# Patient Record
Sex: MALE | Race: WHITE | Employment: STUDENT | URBAN - METROPOLITAN AREA
[De-identification: names, ages, dates, MRNs, and addresses within clinical notes are randomized per-mention and may not be internally consistent; named-entity substitution may affect disease eponyms.]

---
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
## 2022-01-01 ENCOUNTER — HOSPITAL ENCOUNTER (EMERGENCY)
Facility: CLINIC | Age: 22
Discharge: HOME | End: 2022-01-01
Attending: PHYSICIAN ASSISTANT | Admitting: PHYSICIAN ASSISTANT
Payer: COMMERCIAL

## 2022-01-01 ENCOUNTER — APPOINTMENT (OUTPATIENT)
Dept: GENERAL RADIOLOGY | Facility: CLINIC | Age: 22
End: 2022-01-01
Attending: PHYSICIAN ASSISTANT
Payer: COMMERCIAL

## 2022-01-01 VITALS
HEIGHT: 69 IN | BODY MASS INDEX: 28.88 KG/M2 | WEIGHT: 195 LBS | HEART RATE: 82 BPM | RESPIRATION RATE: 16 BRPM | TEMPERATURE: 98.5 F | SYSTOLIC BLOOD PRESSURE: 117 MMHG | OXYGEN SATURATION: 97 % | DIASTOLIC BLOOD PRESSURE: 66 MMHG

## 2022-01-01 DIAGNOSIS — S62.302A CLOSED DISPLACED FRACTURE OF THIRD METACARPAL BONE OF RIGHT HAND, UNSPECIFIED PORTION OF METACARPAL, INITIAL ENCOUNTER: ICD-10-CM

## 2022-01-01 PROCEDURE — 26600 TREAT METACARPAL FRACTURE: CPT | Mod: F7

## 2022-01-01 PROCEDURE — 99284 EMERGENCY DEPT VISIT MOD MDM: CPT | Mod: 25

## 2022-01-01 PROCEDURE — 73130 X-RAY EXAM OF HAND: CPT | Mod: RT

## 2022-01-01 PROCEDURE — 250N000013 HC RX MED GY IP 250 OP 250 PS 637: Performed by: PHYSICIAN ASSISTANT

## 2022-01-01 RX ORDER — HYDROCODONE BITARTRATE AND ACETAMINOPHEN 5; 325 MG/1; MG/1
1 TABLET ORAL EVERY 6 HOURS PRN
Qty: 8 TABLET | Refills: 0 | Status: SHIPPED | OUTPATIENT
Start: 2022-01-01 | End: 2022-01-01

## 2022-01-01 RX ORDER — IBUPROFEN 200 MG
600 TABLET ORAL ONCE
Status: COMPLETED | OUTPATIENT
Start: 2022-01-01 | End: 2022-01-01

## 2022-01-01 RX ORDER — HYDROCODONE BITARTRATE AND ACETAMINOPHEN 5; 325 MG/1; MG/1
1 TABLET ORAL ONCE
Status: COMPLETED | OUTPATIENT
Start: 2022-01-01 | End: 2022-01-01

## 2022-01-01 RX ORDER — HYDROCODONE BITARTRATE AND ACETAMINOPHEN 5; 325 MG/1; MG/1
1 TABLET ORAL EVERY 6 HOURS PRN
Qty: 8 TABLET | Refills: 0 | Status: SHIPPED | OUTPATIENT
Start: 2022-01-01

## 2022-01-01 RX ADMIN — HYDROCODONE BITARTRATE AND ACETAMINOPHEN 1 TABLET: 5; 325 TABLET ORAL at 19:48

## 2022-01-01 RX ADMIN — IBUPROFEN 600 MG: 200 TABLET, FILM COATED ORAL at 19:48

## 2022-01-01 ASSESSMENT — MIFFLIN-ST. JEOR: SCORE: 1874.89

## 2022-01-02 NOTE — ED PROVIDER NOTES
ED ATTENDING PHYSICIAN NOTE:   I evaluated this patient in conjunction with Christiano Patten PA-C  I have participated in the care of the patient and personally performed key elements of the history, exam, and medical decision making.      HPI:   Issac Rome is a 21 year old male right hand dominant presenting with a right hand injury after punching the ground last night. He does have a history of boxer's fracture on this hand but complains of pain in the middle of his hand. Normal sensation. Unable to move his fingers due to pain but he does have range of motion.      EXAM:   Physical Exam   Constitutional:  Patient is oriented to person, place, and time. They appear well-developed and well-nourished. Mild distress secondary to hand pain.    HENT:   Eyes:    Conjunctivae normal and EOM are normal. Pupils are equal, round, and reactive to light.   Neck:    Normal range of motion.   Musculoskeletal:  Edema on the dorsum of the right hand with greatest pain from the third MCP proximally. No pain on the lateral aspect of the hand or the thumb. Sensation is all fingers. Normal pulses. No evidence of compartment syndrome.   Neurological:   Patient is alert and oriented to person, place, and time. Patient has normal strength. No cranial nerve deficit or sensory deficit. GCS 15.   Skin:   Skin is warm and dry. No rash noted. No erythema or lacerations.   Psychiatric:   Patient has a normal mood and affect. Patient's behavior is normal. Judgment and thought content normal.      MEDICAL DECISION MAKING/ASSESSMENT AND PLAN:     Issac Rome is a 21 year old male presenting to the emergency room with a fracture of his right hand. The mechanism of injury is consistent with a metacarpal fracture. There is minimal displacement and it does not require any reduction. He is neurovascularly intact. He was provided a referral for O hand surgery for follow up.       Orthoglass Splint Placement     An Orthoglass volar hand  splint was applied to the right upper extremity and after placement I checked and adjusted the fit to ensure proper positioning. Patient was more comfortable with splint in place. Sensation and circulation are intact after splint placement.            DIAGNOSIS:     ICD-10-CM    1. Closed displaced fracture of third metacarpal bone of right hand, unspecified portion of metacarpal, initial encounter  S62.302A         DISPOSITION:   Discharge     I, Elbert Ashton, am serving as a scribe at 7:22 PM on 1/1/2022 to document services personally performed by Dr. Eric, based on my observations and the provider's statements to me.      1/1/2022  [facility] EMERGENCY DEPT      Sharon Eric MD  01/01/22 1948

## 2022-01-02 NOTE — ED PROVIDER NOTES
"  History     Chief Complaint:  Hand Injury      HPI   Issac Rome is a 21 year old male who presents with mother for right hand injury.  The patient became upset and punched the ground last night.  Striking his right fist.  Since then he has had pain mostly to the middle with swelling.  He tried icing it with no improvement.  He is not on blood thinners.  He denies numbness or tingling in the fingers.  He denies wrist pain.    Review of Systems   All other systems reviewed and are negative.    Allergies:  No Known Allergies      Medications:    HYDROcodone-acetaminophen (NORCO) 5-325 MG tablet  amphetamine-dextroamphetamine (ADDERALL XR) 10 MG per 24 hr capsule  escitalopram (LEXAPRO) 5 MG/5ML solution        Past Medical History:    No past medical history on file.  There are no problems to display for this patient.       Past Surgical History:    No past surgical history on file.    Family History:    No family history on file.    Social History:  Presents with mother    Physical Exam     Patient Vitals for the past 24 hrs:   BP Temp Temp src Pulse Resp SpO2 Height Weight   01/01/22 1818 117/66 98.5  F (36.9  C) Temporal 82 16 97 % 1.753 m (5' 9\") 88.5 kg (195 lb)       Physical Exam  General: Alert and oriented.    Head: Normocephalic.  External ears and nose normal.    Eyes: Pupils equal and round.  Normal tracking.    Pulmonary/Chest: Effort and rate normal    MSK: Swelling with tenderness to palpation over the dorsal mid hand extending over the third and fourth metacarpals.  No tenderness to palpation over anatomic snuffbox or base of thenar eminence.  No pain with axial loading of the thumb.  Normal ROM in elbow without tenderness to palpation.    SKIN:  Warm and dry with strong radial pulse and normal capillary refill.    NEURO:  Normal strength of flexion and extension at MCP, PIP, and DIP joints.  Normal sensation through the radial/ulnar/median nerve distributions.    PSYCH:  Normal " affect      Emergency Department Course     Imaging  XR Hand Right G/E 3 Views   Final Result   IMPRESSION: Minimally displaced acute oblique fracture of the third metacarpal, extending over 4 cm in length, from the dorsal cortex proximally near the CMC joint, through the volar cortex distally at the level of the metaphysis. There is 2 mm fracture    displacement, no significant angulation. No other fracture in the right hand. Normal joint alignment. Moderate soft tissue swelling throughout the right hand.      Procedures:  See attending supervision note for splint.    Emergency Department Course:  Reviewed:  I reviewed nursing notes, vitals, past history and care everywhere    Assessments:   I obtained history and examined the patient as noted above.    I rechecked the patient and explained findings.     Interventions:  Medications   ibuprofen (ADVIL/MOTRIN) tablet 600 mg (has no administration in time range)   HYDROcodone-acetaminophen (NORCO) 5-325 MG per tablet 1 tablet (has no administration in time range)       Disposition:  The patient was discharged to home.    Impression & Plan      Medical Decision Makin year old male presents with hand injury after punching ground.  X-rays demonstrate evidence of 3rd metacarpal fx.  This was splinted as above.  CMS intact, no evidence of neurovascular compromise.  No indication for emergent reduction or orthopedic consultation from the ED.  Head to toe trauma exam otherwise non-concerning.  Discussed orthopedic follow-up and patient will call to schedule appointment for follow-up in 5 days.  Discussed RICE and pain control.  Strict return precautions given for signs of compartment syndrome or neurovascular compromise and these were provided in writing.    Diagnosis:    ICD-10-CM    1. Closed displaced fracture of third metacarpal bone of right hand, unspecified portion of metacarpal, initial encounter  S62.302A        Discharge Medications:  New  Prescriptions    HYDROCODONE-ACETAMINOPHEN (NORCO) 5-325 MG TABLET    Take 1 tablet by mouth every 6 hours as needed for severe pain         Scribe Disclosure:  I, Christiano Patten PA-C, am serving as a scribe at 6:15 PM on 1/1/2022 to document services personally performed by Christiano Patten PA-C based on my observations and the provider's statements to me.      Christiano Patten PA-C  01/01/22 1946